# Patient Record
Sex: MALE | Race: WHITE
[De-identification: names, ages, dates, MRNs, and addresses within clinical notes are randomized per-mention and may not be internally consistent; named-entity substitution may affect disease eponyms.]

---

## 2020-03-28 ENCOUNTER — HOSPITAL ENCOUNTER (INPATIENT)
Dept: HOSPITAL 35 - 3W | Age: 70
LOS: 5 days | Discharge: HOME | DRG: 177 | End: 2020-04-02
Attending: HOSPITALIST | Admitting: HOSPITALIST
Payer: COMMERCIAL

## 2020-03-28 VITALS — BODY MASS INDEX: 30.06 KG/M2 | HEIGHT: 70 IN | WEIGHT: 210 LBS

## 2020-03-28 VITALS — SYSTOLIC BLOOD PRESSURE: 104 MMHG | DIASTOLIC BLOOD PRESSURE: 58 MMHG

## 2020-03-28 DIAGNOSIS — J12.89: ICD-10-CM

## 2020-03-28 DIAGNOSIS — R07.9: ICD-10-CM

## 2020-03-28 DIAGNOSIS — U07.1: Primary | ICD-10-CM

## 2020-03-28 DIAGNOSIS — R79.89: ICD-10-CM

## 2020-03-28 DIAGNOSIS — Z79.82: ICD-10-CM

## 2020-03-28 DIAGNOSIS — Z79.899: ICD-10-CM

## 2020-03-28 DIAGNOSIS — I25.10: ICD-10-CM

## 2020-03-28 DIAGNOSIS — I10: ICD-10-CM

## 2020-03-28 DIAGNOSIS — Z95.5: ICD-10-CM

## 2020-03-28 DIAGNOSIS — R06.02: ICD-10-CM

## 2020-03-28 DIAGNOSIS — Z79.2: ICD-10-CM

## 2020-03-28 LAB
ALBUMIN SERPL-MCNC: 3.3 G/DL (ref 3.4–5)
ALT SERPL-CCNC: 15 U/L (ref 30–65)
ANION GAP SERPL CALC-SCNC: 9 MMOL/L (ref 7–16)
AST SERPL-CCNC: 20 U/L (ref 15–37)
BILIRUB SERPL-MCNC: 0.3 MG/DL
BUN SERPL-MCNC: 23 MG/DL (ref 7–18)
CALCIUM SERPL-MCNC: 8.6 MG/DL (ref 8.5–10.1)
CHLORIDE SERPL-SCNC: 101 MMOL/L (ref 98–107)
CO2 SERPL-SCNC: 27 MMOL/L (ref 21–32)
CREAT SERPL-MCNC: 1 MG/DL (ref 0.7–1.3)
ERYTHROCYTE [DISTWIDTH] IN BLOOD BY AUTOMATED COUNT: 12.6 % (ref 10.5–14.5)
GLUCOSE SERPL-MCNC: 128 MG/DL (ref 74–106)
HCT VFR BLD CALC: 41.4 % (ref 42–52)
HGB BLD-MCNC: 14 GM/DL (ref 14–18)
MCH RBC QN AUTO: 30.3 PG (ref 26–34)
MCHC RBC AUTO-ENTMCNC: 33.8 G/DL (ref 28–37)
MCV RBC: 89.7 FL (ref 80–100)
PLATELET # BLD: 237 THOU/UL (ref 150–400)
POTASSIUM SERPL-SCNC: 3.9 MMOL/L (ref 3.5–5.1)
PROT SERPL-MCNC: 6.7 G/DL (ref 6.4–8.2)
RBC # BLD AUTO: 4.62 MIL/UL (ref 4.5–6)
SODIUM SERPL-SCNC: 137 MMOL/L (ref 136–145)
WBC # BLD AUTO: 7.2 THOU/UL (ref 4–11)

## 2020-03-28 PROCEDURE — 10879: CPT

## 2020-03-28 PROCEDURE — 10078: CPT

## 2020-03-28 PROCEDURE — 10779: CPT

## 2020-03-28 NOTE — NUR
DIRECT ADMIT FROM [facility] FOR TIGHTNESS OF CHEST. ADMITTED BY DR TALLEY FOR CHEST PAIN. PATIENT ARRIVED WITH HEPARIN DRIP. HEPARIN STOPPED.PER
REPORT FROM CLARK COSTA FOR [facility] ER PT WAS GIVEN HEPARIN BOLUS AND DRIP AT [facility] ER. 4 BABY ASA, AND 1 INCH NITRO PASTE. PATIENT TEST FOR COVID-19 AT
[facility] ON 3/26/20.
ALERT X4 FROM HOME WITH WIFE DAVID. PT DENIES CHEST PAIN, PT C/O OF SLIGHT
SHORTNESS OF BREATH. UP AB MASON IN ROOM.CONTINENT OF BLADDER LAST BM 3/26/20.
TOLERATING DIET. NSR ON TELE. FLUIDS ADMINISTERED AS ORDERED. CONSENTS SIGNED.
ADMISSION ASSESMENT AND HISTORY COMPLETED. HOME MEDS RECONSILED.
REPORT GIVEN TO NIGHT NURSE.

## 2020-03-29 VITALS — DIASTOLIC BLOOD PRESSURE: 60 MMHG | SYSTOLIC BLOOD PRESSURE: 121 MMHG

## 2020-03-29 VITALS — DIASTOLIC BLOOD PRESSURE: 79 MMHG | SYSTOLIC BLOOD PRESSURE: 115 MMHG

## 2020-03-29 VITALS — SYSTOLIC BLOOD PRESSURE: 107 MMHG | DIASTOLIC BLOOD PRESSURE: 72 MMHG

## 2020-03-29 VITALS — SYSTOLIC BLOOD PRESSURE: 115 MMHG | DIASTOLIC BLOOD PRESSURE: 74 MMHG

## 2020-03-29 VITALS — SYSTOLIC BLOOD PRESSURE: 96 MMHG | DIASTOLIC BLOOD PRESSURE: 68 MMHG

## 2020-03-29 VITALS — DIASTOLIC BLOOD PRESSURE: 77 MMHG | SYSTOLIC BLOOD PRESSURE: 118 MMHG

## 2020-03-29 VITALS — DIASTOLIC BLOOD PRESSURE: 71 MMHG | SYSTOLIC BLOOD PRESSURE: 121 MMHG

## 2020-03-29 VITALS — DIASTOLIC BLOOD PRESSURE: 71 MMHG | SYSTOLIC BLOOD PRESSURE: 118 MMHG

## 2020-03-29 LAB
ANION GAP SERPL CALC-SCNC: 8 MMOL/L (ref 7–16)
BUN SERPL-MCNC: 21 MG/DL (ref 7–18)
CALCIUM SERPL-MCNC: 8.1 MG/DL (ref 8.5–10.1)
CHLORIDE SERPL-SCNC: 104 MMOL/L (ref 98–107)
CO2 SERPL-SCNC: 26 MMOL/L (ref 21–32)
CREAT SERPL-MCNC: 0.8 MG/DL (ref 0.7–1.3)
ERYTHROCYTE [DISTWIDTH] IN BLOOD BY AUTOMATED COUNT: 12.6 % (ref 10.5–14.5)
GLUCOSE SERPL-MCNC: 112 MG/DL (ref 74–106)
HCT VFR BLD CALC: 36.3 % (ref 42–52)
HGB BLD-MCNC: 12.4 GM/DL (ref 14–18)
MCH RBC QN AUTO: 30.5 PG (ref 26–34)
MCHC RBC AUTO-ENTMCNC: 34.1 G/DL (ref 28–37)
MCV RBC: 89.4 FL (ref 80–100)
PLATELET # BLD: 224 THOU/UL (ref 150–400)
POTASSIUM SERPL-SCNC: 4 MMOL/L (ref 3.5–5.1)
RBC # BLD AUTO: 4.05 MIL/UL (ref 4.5–6)
SODIUM SERPL-SCNC: 138 MMOL/L (ref 136–145)
WBC # BLD AUTO: 6.5 THOU/UL (ref 4–11)

## 2020-03-29 NOTE — NUR
Pt. slept fair during the night. Denies any chest pain.Tolerating room air
well with O2 sat in the upper 90's.Reported shortness of breath with exertion.
Up ad ria in room with steady gait. Cardiology consult called to answering
service last night. Continue on isolation to R/O COVID 19. Afebrile. Will
continue to monitor.

## 2020-03-29 NOTE — NUR
PT ASSUMED AT 0700, PT AWAKE AND PLEASANT. ALERT AND OREINTED X4, NO SIGNS OF
DISTRESS NOTED. PT DENIES ANY CHEST PAIN, ASSESMMENT COMPLETED. PT DENIES ANY
NEEDS, CALL LIGHT IN REACH, BED AT LOWEST LEVEL WITH ALARM ON. WILL CONTINUE
TO MONITOR.

## 2020-03-30 VITALS — SYSTOLIC BLOOD PRESSURE: 131 MMHG | DIASTOLIC BLOOD PRESSURE: 73 MMHG

## 2020-03-30 VITALS — SYSTOLIC BLOOD PRESSURE: 154 MMHG | DIASTOLIC BLOOD PRESSURE: 87 MMHG

## 2020-03-30 VITALS — SYSTOLIC BLOOD PRESSURE: 127 MMHG | DIASTOLIC BLOOD PRESSURE: 80 MMHG

## 2020-03-30 VITALS — SYSTOLIC BLOOD PRESSURE: 139 MMHG | DIASTOLIC BLOOD PRESSURE: 99 MMHG

## 2020-03-30 VITALS — DIASTOLIC BLOOD PRESSURE: 71 MMHG | SYSTOLIC BLOOD PRESSURE: 144 MMHG

## 2020-03-30 LAB
CHOLEST SERPL-MCNC: 80 MG/DL (ref ?–200)
HDLC SERPL-MCNC: 24 MG/DL (ref 40–?)
LDLC SERPL-MCNC: 42 MG/DL (ref ?–100)
TC:HDL: 3.3 RATIO
TRIGL SERPL-MCNC: 73 MG/DL (ref ?–150)
VLDLC SERPL CALC-MCNC: 15 MG/DL (ref ?–40)

## 2020-03-30 NOTE — NUR
1030 [facility] laboratory called about pt COVID19
results, was told results not in yet and normally their results dont get in
until 8-9 days

## 2020-03-30 NOTE — NUR
INITIAL ASSESSMENT:
SW reviewed chart and spoke with attending physician. Pt was transferred to
[facility] from [facility] due to NStemi/elevated troponin. Cardiology is
consulted and following. Pt will need an echo/angio when COVID-19 screen is
negative. Pt may need a cardiac cath. SW attempted to contact pt in room to
obtain assessment info. Per chart, pt is alert/orientated x 4. Pt lives with
his wife in [place]. WINNIE is following to assist as needed with discharge
planning.

## 2020-03-30 NOTE — NUR
PT MAKING SLOW PROGRESS TOWARDS GOALS.  ON ROOM AIR THROUGHOUT THE NIGHT.
DENIES ANY SOA WHILE BEING UP TO THE TOILET.  HAS DENIED ANY CHEST PAIN
OVERNIGHT.  "BUT THERE IS SOMETHING THERE, I CAN FEEL IT THAT SOMETHING JUST
DOESN'T FEEL RIGHT."  ENCOURAGED TO CALL WITH ANY AXIETY OR ANY SENSE OF
IMPENDING DOOM.

## 2020-03-30 NOTE — NUR
PT CARE ASSUMED AT 0700, PT ALERT AND OREINTED X4, DENIES CHEST PAIN, NAUSEA
AND VOMITING. NO SIGNS OF DISTRESS NOTED. DYSPNEA WITH EXERTION. ASSESSMENT
COMPLETED. PT EATING BREAKFAST AT THE MOMENT. DENIES ANY NEEDS. CALL LIGHT AND
TABLE IN REACH. BED AT LOWEST LEVEL. WILL CONTINUE TO MONITOR.

## 2020-03-30 NOTE — NUR
SPOKE WITH  AT [facility].  NO RESULTS YET AS TO STATUS OF
COVID-19 SWAB.  DID PROVIDE TELEPHONE NUMBER AND FAX NUMBER TO CONTACT US
SHOULD RESULTS BE CALLED TO THEIR LAB INSTEAD OF [facility].

## 2020-03-31 VITALS — DIASTOLIC BLOOD PRESSURE: 49 MMHG | SYSTOLIC BLOOD PRESSURE: 95 MMHG

## 2020-03-31 VITALS — DIASTOLIC BLOOD PRESSURE: 79 MMHG | SYSTOLIC BLOOD PRESSURE: 128 MMHG

## 2020-03-31 VITALS — SYSTOLIC BLOOD PRESSURE: 136 MMHG | DIASTOLIC BLOOD PRESSURE: 73 MMHG

## 2020-03-31 VITALS — DIASTOLIC BLOOD PRESSURE: 47 MMHG | SYSTOLIC BLOOD PRESSURE: 102 MMHG

## 2020-03-31 VITALS — DIASTOLIC BLOOD PRESSURE: 83 MMHG | SYSTOLIC BLOOD PRESSURE: 132 MMHG

## 2020-03-31 VITALS — SYSTOLIC BLOOD PRESSURE: 118 MMHG | DIASTOLIC BLOOD PRESSURE: 64 MMHG

## 2020-03-31 NOTE — NUR
PT CARE ASSUMED AT 0700, PT ALERT AND ORIENTED X4, DENIES NAUSEA, VOMITING AND
CHEST PAIN. PT COMPLAINS OF CHEST TIGHTNESS/PRESSURE WHEN UP TO THE BATHROOM.
DR. TALLEY MADE AWARE. PT DENIES ANY OTHER NEEDS AT THIS TIME. CALL LIGHT AND
TABLE WITH REACH. BED TA LOWEST LEVEL. WILL CONTINUE TO MONITOR.

## 2020-03-31 NOTE — NUR
PT MAKING PROGRESS TOWARDS GOALS.  PTINITIALLY REPORTED 1/10 LEFT CHEST
TIGHTNESS THAT OCCURED WITH ACITIVITY (UP TO USE THE BATHROOM).  HE DID STATE
THAT THE DISCOMFORT RECEEDS PROMPTLY WITH REST. "I JUST THINK THAT SOMETHING
IS WRONG."  HAS DENIED ANY SOA.

## 2020-03-31 NOTE — NUR
SW reviewed chart and spoke with nursing and attending physician. Pt had
COVID-19 test ordered yesterday. Pt was previously tested at [facility] on
3/26. SW notified by nursing that pt's results from [facility] are positive
for COVID-19. Pt transferred to ICU. WINNIE is following to assist as needed with
discharge planning.

## 2020-04-01 VITALS — DIASTOLIC BLOOD PRESSURE: 55 MMHG | SYSTOLIC BLOOD PRESSURE: 116 MMHG

## 2020-04-01 VITALS — SYSTOLIC BLOOD PRESSURE: 131 MMHG | DIASTOLIC BLOOD PRESSURE: 69 MMHG

## 2020-04-01 VITALS — DIASTOLIC BLOOD PRESSURE: 59 MMHG | SYSTOLIC BLOOD PRESSURE: 116 MMHG

## 2020-04-01 VITALS — DIASTOLIC BLOOD PRESSURE: 62 MMHG | SYSTOLIC BLOOD PRESSURE: 113 MMHG

## 2020-04-01 VITALS — SYSTOLIC BLOOD PRESSURE: 108 MMHG | DIASTOLIC BLOOD PRESSURE: 63 MMHG

## 2020-04-01 LAB
ANION GAP SERPL CALC-SCNC: 6 MMOL/L (ref 7–16)
BUN SERPL-MCNC: 10 MG/DL (ref 7–18)
CALCIUM SERPL-MCNC: 7.8 MG/DL (ref 8.5–10.1)
CHLORIDE SERPL-SCNC: 104 MMOL/L (ref 98–107)
CO2 SERPL-SCNC: 27 MMOL/L (ref 21–32)
CREAT SERPL-MCNC: 0.8 MG/DL (ref 0.7–1.3)
ERYTHROCYTE [DISTWIDTH] IN BLOOD BY AUTOMATED COUNT: 12.2 % (ref 10.5–14.5)
GLUCOSE SERPL-MCNC: 104 MG/DL (ref 74–106)
HCT VFR BLD CALC: 29.7 % (ref 42–52)
HGB BLD-MCNC: 10.2 GM/DL (ref 14–18)
MCH RBC QN AUTO: 30.8 PG (ref 26–34)
MCHC RBC AUTO-ENTMCNC: 34.4 G/DL (ref 28–37)
MCV RBC: 89.7 FL (ref 80–100)
PLATELET # BLD: 286 THOU/UL (ref 150–400)
POTASSIUM SERPL-SCNC: 3.7 MMOL/L (ref 3.5–5.1)
RBC # BLD AUTO: 3.31 MIL/UL (ref 4.5–6)
SODIUM SERPL-SCNC: 137 MMOL/L (ref 136–145)
WBC # BLD AUTO: 7.1 THOU/UL (ref 4–11)

## 2020-04-01 NOTE — NUR
1900. PT ALERT AND ORIENTED.  VSS WITH OCCASIONALLY SOFT PRESSURES. NO FEVER ,
NO NAUSEA OR VOMITING REPORTED. PT DENIES ANY CHEST PAIN OR SOB. VOIDING TO
THE BATHROOM; STEADY GAIT.  SR TO SB ON THE MONITOR WHEN ASLEEP.  PT ALSO
DESATS WHEN SLEEPING.  AS PT HX OF SLEEP APNEA AND STATED, " MY WIFE TELLS ME
I STOP BREATHING SOMETIMES WHEN SLEEPING." PT OTHERWISE DENIES ANY HX OF . O2
3L NC INITIATED. SATS MAINTAINED > 98.  NO FURTHER ISSUE AT THIS TIME.
ISOLATION FOR COVID+ MAINTAINED. WIFE UPDATE ON PATIENTS CONDITION LAST NIGHT.
WILL CONTINUE TO FOLLOW POC.

## 2020-04-01 NOTE — NUR
ASSESSMENTS AND INTERVENTIONS AS DOCCUMENTED. NO MAJOR CHANGES THROUGH OUT
SHIFT. PATIENT UP ADLIB NEEDING MINIMAL ASSISTANCE. PATIENT PROGRESSING
TOWARDS GOALS AS EVIDENCE BY INCREASED MOBILITY AND APETITIE. CARDIOLOGY NOT
INTERVENING AT THIS TIME.

## 2020-04-02 VITALS — DIASTOLIC BLOOD PRESSURE: 84 MMHG | SYSTOLIC BLOOD PRESSURE: 162 MMHG

## 2020-04-02 VITALS — SYSTOLIC BLOOD PRESSURE: 132 MMHG | DIASTOLIC BLOOD PRESSURE: 63 MMHG

## 2020-04-02 VITALS — SYSTOLIC BLOOD PRESSURE: 138 MMHG | DIASTOLIC BLOOD PRESSURE: 72 MMHG

## 2020-04-02 VITALS — SYSTOLIC BLOOD PRESSURE: 148 MMHG | DIASTOLIC BLOOD PRESSURE: 70 MMHG

## 2020-04-02 LAB
ANION GAP SERPL CALC-SCNC: 7 MMOL/L (ref 7–16)
BUN SERPL-MCNC: 9 MG/DL (ref 7–18)
CALCIUM SERPL-MCNC: 8 MG/DL (ref 8.5–10.1)
CHLORIDE SERPL-SCNC: 104 MMOL/L (ref 98–107)
CO2 SERPL-SCNC: 27 MMOL/L (ref 21–32)
CREAT SERPL-MCNC: 0.8 MG/DL (ref 0.7–1.3)
ERYTHROCYTE [DISTWIDTH] IN BLOOD BY AUTOMATED COUNT: 12.2 % (ref 10.5–14.5)
GLUCOSE SERPL-MCNC: 102 MG/DL (ref 74–106)
HCT VFR BLD CALC: 33 % (ref 42–52)
HGB BLD-MCNC: 11.3 GM/DL (ref 14–18)
MCH RBC QN AUTO: 30.3 PG (ref 26–34)
MCHC RBC AUTO-ENTMCNC: 34.2 G/DL (ref 28–37)
MCV RBC: 88.7 FL (ref 80–100)
PLATELET # BLD: 353 THOU/UL (ref 150–400)
POTASSIUM SERPL-SCNC: 3.8 MMOL/L (ref 3.5–5.1)
RBC # BLD AUTO: 3.72 MIL/UL (ref 4.5–6)
SODIUM SERPL-SCNC: 138 MMOL/L (ref 136–145)
WBC # BLD AUTO: 6.9 THOU/UL (ref 4–11)

## 2020-04-02 NOTE — NUR
ASSUMED PT CARE AT 1900. PT IS ALERT AND ORIENTED. PT IS LAYING IN BED. NO
SIGN OF DISTRESS NOTED IN PT. DENIES ANY PAIN. PT IS STABLE. VITAL SIGNS
STABLE. ASSESSMENT COMPLETED AND DOCUMENTED. ISOLATION IN PLACE. SCHEDULED
MEDS ADMINISTERED TO PT. PT IS AFEBRILE. CONTINUE TO MONITOR PT. DENIES ANY
FURTHER NEEDS AT THIS TIME.

## 2020-04-02 NOTE — NUR
PT DC TO HOME WILL COURATIN AT HOME DISCHARGE INSTRUCTIONS GIVEN PER NURSING
VERBALZIE UNDERSTANDING. WIFE ON HER WAY TO GET PT FOR . ALL BELONGING
WITH PT PRIOR TO DISCHARGE. NO CONCERNS OR ISSUES NOTED AT THIS TIME

## 2020-04-02 NOTE — NUR
discussed during los, pt will dc home today and need to self quarantine. he
asymptomatic of covid 19, which he tested positive for. follow up with
cardiology in about 3 weeks. cm left message from bedside nurse. will cont
following as needed for dc needs.

## 2020-04-24 ENCOUNTER — HOSPITAL ENCOUNTER (OUTPATIENT)
Dept: HOSPITAL 35 - SJCVCIMAG | Age: 70
End: 2020-04-24
Attending: INTERNAL MEDICINE
Payer: COMMERCIAL

## 2020-04-24 DIAGNOSIS — Z87.891: ICD-10-CM

## 2020-04-24 DIAGNOSIS — E78.00: ICD-10-CM

## 2020-04-24 DIAGNOSIS — R00.1: ICD-10-CM

## 2020-04-24 DIAGNOSIS — Z79.899: ICD-10-CM

## 2020-04-24 DIAGNOSIS — I35.8: Primary | ICD-10-CM

## 2020-04-24 DIAGNOSIS — Z79.82: ICD-10-CM

## 2020-04-24 DIAGNOSIS — E78.5: ICD-10-CM

## 2020-04-24 DIAGNOSIS — I10: ICD-10-CM

## 2020-04-24 DIAGNOSIS — R53.83: ICD-10-CM

## 2020-04-24 DIAGNOSIS — I25.10: ICD-10-CM

## 2020-04-24 DIAGNOSIS — Z95.5: ICD-10-CM

## 2020-05-04 ENCOUNTER — HOSPITAL ENCOUNTER (INPATIENT)
Dept: HOSPITAL 35 - CATH | Age: 70
Discharge: HOME | DRG: 286 | End: 2020-05-04
Attending: INTERNAL MEDICINE | Admitting: INTERNAL MEDICINE
Payer: COMMERCIAL

## 2020-05-04 VITALS — SYSTOLIC BLOOD PRESSURE: 153 MMHG | DIASTOLIC BLOOD PRESSURE: 85 MMHG

## 2020-05-04 VITALS — DIASTOLIC BLOOD PRESSURE: 75 MMHG | SYSTOLIC BLOOD PRESSURE: 156 MMHG

## 2020-05-04 VITALS — WEIGHT: 205.01 LBS | BODY MASS INDEX: 31.07 KG/M2 | HEIGHT: 67.99 IN

## 2020-05-04 VITALS — DIASTOLIC BLOOD PRESSURE: 81 MMHG | SYSTOLIC BLOOD PRESSURE: 158 MMHG

## 2020-05-04 VITALS — SYSTOLIC BLOOD PRESSURE: 139 MMHG | DIASTOLIC BLOOD PRESSURE: 119 MMHG

## 2020-05-04 DIAGNOSIS — E78.5: ICD-10-CM

## 2020-05-04 DIAGNOSIS — I25.10: Primary | ICD-10-CM

## 2020-05-04 DIAGNOSIS — U07.1: ICD-10-CM

## 2020-05-04 DIAGNOSIS — I10: ICD-10-CM

## 2020-05-04 DIAGNOSIS — J44.9: ICD-10-CM

## 2020-05-04 DIAGNOSIS — M19.90: ICD-10-CM

## 2020-05-04 DIAGNOSIS — E78.00: ICD-10-CM

## 2020-05-04 DIAGNOSIS — Z82.49: ICD-10-CM

## 2020-05-04 DIAGNOSIS — Z98.52: ICD-10-CM

## 2020-05-04 LAB
ALBUMIN SERPL-MCNC: 3.8 G/DL (ref 3.4–5)
ALT SERPL-CCNC: 21 U/L (ref 30–65)
ANION GAP SERPL CALC-SCNC: 6 MMOL/L (ref 7–16)
APTT BLD: 27.8 SECONDS (ref 24.5–32.8)
AST SERPL-CCNC: 14 U/L (ref 15–37)
BASOPHILS NFR BLD AUTO: 0.9 % (ref 0–2)
BILIRUB SERPL-MCNC: 0.7 MG/DL
BILIRUB UR-MCNC: NEGATIVE MG/DL
BUN SERPL-MCNC: 15 MG/DL (ref 7–18)
CALCIUM SERPL-MCNC: 8.5 MG/DL (ref 8.5–10.1)
CHLORIDE SERPL-SCNC: 100 MMOL/L (ref 98–107)
CO2 SERPL-SCNC: 30 MMOL/L (ref 21–32)
COLOR UR: YELLOW
CREAT SERPL-MCNC: 0.9 MG/DL (ref 0.7–1.3)
EOSINOPHIL NFR BLD: 5.1 % (ref 0–3)
ERYTHROCYTE [DISTWIDTH] IN BLOOD BY AUTOMATED COUNT: 13.5 % (ref 10.5–14.5)
GLUCOSE SERPL-MCNC: 125 MG/DL (ref 74–106)
GRANULOCYTES NFR BLD MANUAL: 44 % (ref 36–66)
HCT VFR BLD CALC: 39.4 % (ref 42–52)
HGB BLD-MCNC: 13.4 GM/DL (ref 14–18)
INR PPP: 1.1
KETONES UR STRIP-MCNC: NEGATIVE MG/DL
LYMPHOCYTES NFR BLD AUTO: 41.2 % (ref 24–44)
MCH RBC QN AUTO: 31.1 PG (ref 26–34)
MCHC RBC AUTO-ENTMCNC: 34.1 G/DL (ref 28–37)
MCV RBC: 91.1 FL (ref 80–100)
MONOCYTES NFR BLD: 8.8 % (ref 1–8)
NEUTROPHILS # BLD: 3.3 THOU/UL (ref 1.4–8.2)
PLATELET # BLD: 255 THOU/UL (ref 150–400)
POTASSIUM SERPL-SCNC: 3.3 MMOL/L (ref 3.5–5.1)
PROT SERPL-MCNC: 7.5 G/DL (ref 6.4–8.2)
PROTHROMBIN TIME: 10.9 SECONDS (ref 9.3–11.4)
RBC # BLD AUTO: 4.32 MIL/UL (ref 4.5–6)
RBC # UR STRIP: NEGATIVE /UL
SODIUM SERPL-SCNC: 136 MMOL/L (ref 136–145)
SP GR UR STRIP: 1.01 (ref 1–1.03)
URINE CLARITY: CLEAR
URINE GLUCOSE-RANDOM*: NEGATIVE
URINE LEUKOCYTES-REFLEX: NEGATIVE
URINE NITRITE-REFLEX: NEGATIVE
URINE PROTEIN (DIPSTICK): NEGATIVE
UROBILINOGEN UR STRIP-ACNC: 0.2 E.U./DL (ref 0.2–1)
WBC # BLD AUTO: 7.5 THOU/UL (ref 4–11)

## 2020-05-04 PROCEDURE — 10081 I&D PILONIDAL CYST COMP: CPT

## 2020-05-04 NOTE — 2DMMODE
[facility]
[address]
[place]   [address]                   2 D/M-MODE ECHOCARDIOGRAM     
_______________________________________________________________________________
 
Name:       ANNI RANGEL SHON                Room #:         206-P       ADM IN  
M.R.#:      [id number]                       Account #:      [id number]  
Admission:  05/04/20    Attend Phys:    Brad Paiz MD,
Discharge:              Date of Birth:  [birth year]  
                                                          Report #: [id number]
                                                                    [id number]
_______________________________________________________________________________
THIS REPORT FOR:  
 
cc:  Michael Kimbrough MD, Curtis MD Mancuso, Gerald M. MD formerly [facility]                                        
                                                                       ~
 
--------------- APPROVED REPORT --------------
 
 
Study performed:  05/04/2020 15:58:32
 
EXAM: Limited 2D, Doppler, and color-flow Echocardiogram 
Patient Location: Bedside   
Room #:  206     Status:  routine
 
      BSA:         2.07
HR: 60 bpm BP:          139/119 mmHg 
 
Other Information 
Study Quality: Adequate
 
Indications
CAD
Pre-op
 
2D Dimensions
IVSd:  13.72 (7-11mm) 
LVDd:  44.26 mm  
PWd:  13.64 (7-11mm) 
LVDs:  29.60 (25-40mm) 
   IVC:  18.00 mm
 
Aortic Valve
AoV Peak Markel.:  1.18 m/s 
AO Peak Gr.:  7.03 mmHg  LVOT Max PG:  3.32 mmHg
    LVOT Max V:  0.91 m/s
AI Vmax:  3.83 m/s  
AI Oneida:  1.38 m/s2  
AI PHT:  804.82 ms  
 
Tricuspid Valve
 RAP Estimate:  5.00 mmHg
 
Left Ventricle
The left ventricle is normal size. Mild to moderate concentric left 
 
 
[facility]
[address]
[place]  [address]
Phone:  [phone]                    2 D/M-MODE ECHOCARDIOGRAM     
_______________________________________________________________________________
 
Name:            ANNI RANGEL                Room #:        [facility]-       ADM IN
M.R.#:           [id number]          Account #:     [id number]  
Admission:       05/04/20         Attend Phys:   Brad Paiz,
Discharge:                  Date of Birth: [birth year]  
                         Report #:      [id number]
        [id number]
_______________________________________________________________________________
ventricular hypertrophy. The left ventricular systolic function is 
normal. The left ventricular ejection fraction is within the normal 
range. LVEF is 60-65%.
 
Right Ventricle
The right ventricle is normal size. The right ventricular systolic 
function is normal.
 
Atria
The left atrium size is normal. The right atrium size is 
normal.
 
Aortic Valve
The aortic valve is normal in structure. Mild aortic regurgitation. 
There is no aortic valvular stenosis.
 
Mitral Valve
The mitral valve is normal in structure. There is no mitral valve 
regurgitation noted. No evidence of mitral valve stenosis.
 
Tricuspid Valve
The tricuspid valve is normal in structure. There is no tricuspid 
valve regurgitation noted. Unable to assess PA pressure.
 
Great Vessels
The aortic root is normal in size. IVC is normal in size and 
collapses >50% with inspiration.
 
Pericardium
There is no pericardial effusion.
 
<Conclusion>
The left ventricle is normal size.
Mild to moderate concentric left ventricular hypertrophy.
LVEF is 60-65%.
The right ventricle is normal size.
The left atrium size is normal.
Mild aortic regurgitation.
There is no mitral valve regurgitation noted.
There is no tricuspid valve regurgitation noted.
Unable to assess PA pressure.
 
 
[facility]
[address]
[place]  [address]
Phone:  [phone]                    2 D/M-MODE ECHOCARDIOGRAM     
_______________________________________________________________________________
 
Name:            CLAIREANNI MENENDEZ                Room #:        206-P       [facility] [place]
M.R.#:           [id number]          Account #:     [id number]  
Admission:       05/04/20         Attend Phys:   Brad Paiz,
Discharge:                  Date of Birth: [birth year]  
                         Report #:      [id number]
        [id number]
_______________________________________________________________________________
The aortic root is normal in size.
There is no pericardial effusion.
 
 
 
 
 
 
 
 
 
 
 
 
 
 
 
 
 
 
 
 
 
 
 
 
 
 
 
 
 
 
 
 
 
 
 
 
 
 
 
 
 
 
  <ELECTRONICALLY SIGNED>
   By: Brad Paiz MD, FACC   
  05/04/20 1748
D: 05/04/20 1748                           _____________________________________
T: 05/04/20 1748                           Brad Paiz MD, FACC     /INF

## 2020-05-04 NOTE — NUR
PT ADMITED FROM CATH LAB. ADMISSION HX AND ASSESSMENT COMPLETED. VSS. RIGHT
GROIN INCISION C/D/I. NO HEMATOMA NOTED. DR. MOE CONSULTED FOR CABG ON
THURSDAY. NEW ORDERS NOTED. WILL CONTINUE TO MONITOR.

## 2020-05-04 NOTE — HC
[facility]
[address]
[place]   [address]                     CONSULTATION                  
_______________________________________________________________________________
 
Name:       ANNI RANGEL                Room #:         206-P       ADM IN  
M.R.#:      [id number]                       Account #:      [id number]  
Admission:  05/04/20    Attend Phys:    Brad Paiz MD,
Discharge:              Date of Birth:  [birth year]  
                                                          Report #: [id number]
                                                                    [id number]   
_______________________________________________________________________________
THIS REPORT FOR:  
 
cc:  Michael Kimbrough MD,Michael Villar,Kirk GIFFORD MD                                            ~
CC: Michael Paiz
 
DATE OF SERVICE:  05/04/2020
 
 
We were asked to see the patient by Dr. Paiz.
 
HISTORY OF PRESENT ILLNESS:  The patient is a 69-year-old who had coronavirus. 
The patient was hospitalized with progressive chest pain and shortness of
breath.  He also had an elevated troponin.  The patient had fever and fatigue. 
He was treated for COVID-19 with Plaquenil and azithromycin and since treatment
has had 2 negative tests, the last testing approximately 2 weeks ago according
to the patient.
 
Today, the patient has a cardiac catheterization that shows important LAD and
circumflex stenosis.  The patient has a previous stent in the circumflex that
has 80% lesion and that there is also 90% LAD stenosis, right coronary has
trivial disease.  Left ventricular function is satisfactory.
 
PAST MEDICAL HISTORY:  Significant for hypertension.  The patient was also
treated for hyperlipidemia.  He is a former smoker.
 
MEDICATIONS:  Include aspirin, atorvastatin, iron, hydroxychloroquine,
quinapril, Bystolic.
 
ALLERGIES:  None known.
 
SOCIAL HISTORY:  As mentioned, former smoker.  The patient is retired, lives
with his wife in the [facility].
 
REVIEW OF SYSTEMS:
GENERAL:  As mentioned, has some fatigue and shortness of breath, previous fever
with the COVID.  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
RESPIRATORY:  Short of breath with the COVID, better now.
CARDIAC:  Chest heaviness with COVID, better now.  No palpitations.
GASTROINTESTINAL:  No nausea, vomiting, or diarrhea.
GENITOURINARY:  No urgency, frequency, or blood.
MUSCULOSKELETAL:  No bone or joint pain.
SKIN:  No rash or infection.
NEUROLOGIC:  No motor or sensory dysfunction.
 
 
 
[facility]
[address]
[place]   [address]                     CONSULTATION                  
_______________________________________________________________________________
 
Name:       ANNI RANGEL SHON                Room #:         206-[facility] [place]  
M.R.#:      [id number]                       Account #:      [id number]  
Admission:  05/04/20    Attend Phys:    Brad Paiz MD,
Discharge:              Date of Birth:  [birth year]  
                                                          Report #: [id number]
                                                                    [id number]   
_______________________________________________________________________________
ENDOCRINE:  No hot or cold intolerance.  No goiter, no tremor.
HEMATOLOGIC:  No bruisability or bleeding.
 
PHYSICAL EXAMINATION:
VITAL SIGNS:  Blood pressure 151/73, heart rate 62, O2 sat 93 on room air.
HEENT:  No scleral icterus, no arcus.
NECK:  No mass, no bruit.
CHEST:  Clear to auscultation.
HEART:  Rhythm regular.
ABDOMEN:  Soft.
EXTREMITIES:  No clubbing, cyanosis or edema.
VASCULAR:  No obvious saphenous vein problems, 2+ dorsalis pedis pulses
bilaterally.
NEUROLOGIC:  No motor or sensory dysfunction.
MUSCULOSKELETAL:  No bone or joint asymmetry or deformity.
PSYCHIATRIC:  Shows insight into problem.
 
ASSESSMENT:  The patient has severe left anterior descending and circumflex
coronary artery disease with preserved ventricular function.  Risks and details
of the surgery for this were discussed.  Risks include but are not limited to
bleeding, infection, anesthesia risks, heart and lung problems, stroke and
death.  Options and alternatives were reviewed.  The patient understands all of
this and wishes to proceed.  We will arrange for surgery later this week.
 
Thank you for the consult.
 
 
 
 
 
 
 
 
 
 
 
 
 
 
 
 
 
 
 
  <ELECTRONICALLY SIGNED>
   By: Kirk Villar MD            
  05/04/20     1328
D: 05/04/20 1149                           _____________________________________
T: 05/04/20 1250                           Kirk Villar MD              /nt

## 2020-05-04 NOTE — H
[facility]
[address]
[place]   [address]                     HISTORY AND PHYSICAL          
_______________________________________________________________________________
 
Name:       ANNI RANGEL                Room #:                     REG GILLES GARCIA.#:      [id number]                       Account #:      [id number]  
Admission:  05/04/20    Attend Phys:    Brad Paiz MD,
Discharge:              Date of Birth:  [birth year]  
                                                          Report #: [id number]
                                                                    [id number]   
_______________________________________________________________________________
THIS REPORT FOR:  
 
cc:  Michael Kimbrough MD,Brad Cosme MD, MD [facility]                                    ~
CC: Michael Young MD
 
DATE OF SERVICE:  05/04/2020
 
 
HISTORY OF PRESENT ILLNESS:  The patient is a 69-year-old male well known to
myself.  He is admitted today for right and left heart catheterization.  He had
been diagnosed over a month ago with the COVID virus, was hospitalized here and
then since tested negative x 2.  He was not "very sick" with this and he has
short time in the hospital, has done well since, but having some stable anginal
symptoms.  His wife tells a slightly different story that he is having more
angina than he would admit to and has not had the energy that he once had, this
is unrelated, appears to his prior infection.  No fever or chills.  He did have
a slight bump in the troponin at his hospitalization last month, but we did not
evaluate that further, wanted him to recover from the virus.  He has been
maintained on aspirin, atorvastatin, iron, Plaquenil, quinapril and Bystolic 20.
 
PAST MEDICAL HISTORY:  Positive for coronary artery disease, emergent stents
placed to the circumflex in 2006, hypertension, hypercholesterolemia, DJD,
vasectomy, a prior smoker.
 
SOCIAL HISTORY:  He is retired.  He is .  Prior tobacco.  Prior alcohol,
no drug use.
 
FAMILY HISTORY:  Positive for premature coronary artery disease.
 
ALLERGIES:  No known drug allergies.
 
PHYSICAL EXAMINATION:
VITAL SIGNS:  Pulse was 70s, blood pressure 160/80s.
HEENT:  Eyes reveal xanthelasmas.  Pharynx is clear.
NECK:  Shows preserved upstrokes without JVD or bruits.
LUNGS:  Slightly prolonged expiratory phase, but clear.
CARDIOVASCULAR:  Regular rate and rhythm, S1, S2.
ABDOMEN:  Soft.  No HSM or abdominal bruit.
EXTREMITIES:  Reveal diminished pulses, but intact.
NEUROLOGIC:  Nonfocal.
SKIN:  Warm and dry.  There are some eczematous changes on his face with slight
malar rash noted.  No ulcers.
 
 
 
[facility]
[address]
[place]   [address]                     HISTORY AND PHYSICAL          
_______________________________________________________________________________
 
Name:       ANNI RANGEL SHON                Room #:                     ALICIA CAMPOVERDE 
NATHANIEL#:      [id number]                       Account #:      [id number]  
Admission:  05/04/20    Attend Phys:    Brad Paiz MD,
Discharge:              Date of Birth:  [birth year]  
                                                          Report #: [id number]
                                                                    [id number]   
_______________________________________________________________________________
 
ASSESSMENT:
1.  Coronary artery disease, cardiac catheterization today reveals significant
3-vessel coronary artery disease.  He is with significant proximal
calcification.  He is going to be best served with bypass surgery for
revascularization.
2.  Hypertension.
3.  Hypercholesterolemia.
4.  Degenerative joint disease.
5.  Status post COVID infection with negative tests x 2.
6.  Chronic obstructive pulmonary disease with prior tobacco use.
 
RECOMMENDATIONS AND PLAN:  The patient will be transferred to CCU.  We will
obtain a stent.  We will obtain carotid Doppler and proceed on with
revascularization by bypass in the next day or two.  CV surgical consultation. 
I have discussed with Dr. Villar and the patient's wife.
 
Thank you for asking me to assist in the care of this patient.
 
 
 
 
 
 
 
 
 
 
 
 
 
 
 
 
 
 
 
 
 
 
 
 
 
 
                         
   By:                               
                   
D: 05/04/20 1150                           _____________________________________
T: 05/04/20 1257                           Brad Paiz MD, FACC     /nt

## 2020-05-05 LAB
EST. AVERAGE GLUCOSE BLD GHB EST-MCNC: 117 MG/DL
GLYCOHEMOGLOBIN (HGB A1C): 5.7 % (ref 4.8–5.6)

## 2020-05-07 ENCOUNTER — HOSPITAL ENCOUNTER (INPATIENT)
Dept: HOSPITAL 35 - PRE | Age: 70
LOS: 14 days | Discharge: HOME | DRG: 235 | End: 2020-05-21
Attending: THORACIC SURGERY (CARDIOTHORACIC VASCULAR SURGERY) | Admitting: THORACIC SURGERY (CARDIOTHORACIC VASCULAR SURGERY)
Payer: COMMERCIAL

## 2020-05-07 VITALS — WEIGHT: 204 LBS | BODY MASS INDEX: 30.92 KG/M2 | HEIGHT: 67.99 IN

## 2020-05-07 DIAGNOSIS — E78.5: ICD-10-CM

## 2020-05-07 DIAGNOSIS — I10: ICD-10-CM

## 2020-05-07 DIAGNOSIS — E78.00: ICD-10-CM

## 2020-05-07 DIAGNOSIS — I25.10: Primary | ICD-10-CM

## 2020-05-07 DIAGNOSIS — Z79.899: ICD-10-CM

## 2020-05-07 DIAGNOSIS — E87.70: ICD-10-CM

## 2020-05-07 DIAGNOSIS — J91.8: ICD-10-CM

## 2020-05-07 DIAGNOSIS — D62: ICD-10-CM

## 2020-05-07 DIAGNOSIS — J96.21: ICD-10-CM

## 2020-05-07 DIAGNOSIS — Z79.82: ICD-10-CM

## 2020-05-07 DIAGNOSIS — I48.0: ICD-10-CM

## 2020-05-07 DIAGNOSIS — J44.9: ICD-10-CM

## 2020-05-07 DIAGNOSIS — M19.90: ICD-10-CM

## 2020-05-07 PROCEDURE — 54118: CPT

## 2020-05-07 PROCEDURE — 47002: CPT

## 2020-05-07 PROCEDURE — 52259: CPT

## 2020-05-07 PROCEDURE — 65120: CPT

## 2020-05-07 PROCEDURE — 56524: CPT

## 2020-05-07 PROCEDURE — 56668: CPT

## 2020-05-07 PROCEDURE — 62110: CPT

## 2020-05-07 PROCEDURE — 65047: CPT

## 2020-05-07 PROCEDURE — 47297: CPT

## 2020-05-07 PROCEDURE — 56527: CPT

## 2020-05-07 PROCEDURE — 57116: CPT

## 2020-05-07 PROCEDURE — 56534: CPT

## 2020-05-07 PROCEDURE — 56525: CPT

## 2020-05-07 PROCEDURE — 53358: CPT

## 2020-05-07 PROCEDURE — 50010 RENAL EXPLORATION: CPT

## 2020-05-07 PROCEDURE — 52131: CPT

## 2020-05-07 PROCEDURE — 56455: CPT

## 2020-05-07 PROCEDURE — 50668: CPT

## 2020-05-07 PROCEDURE — 50456: CPT

## 2020-05-07 PROCEDURE — 53327: CPT

## 2020-05-07 PROCEDURE — 47000 NEEDLE BIOPSY OF LIVER PERQ: CPT

## 2020-05-07 PROCEDURE — 56898: CPT

## 2020-05-07 PROCEDURE — 65003: CPT

## 2020-05-07 PROCEDURE — 50409: CPT

## 2020-05-07 PROCEDURE — 52314: CPT

## 2020-05-07 PROCEDURE — 57167: CPT

## 2020-05-07 PROCEDURE — 57093: CPT

## 2020-05-07 PROCEDURE — 50249: CPT

## 2020-05-07 PROCEDURE — 50498: CPT

## 2020-05-07 PROCEDURE — 56531: CPT

## 2020-05-07 PROCEDURE — 56528: CPT

## 2020-05-07 PROCEDURE — 10081 I&D PILONIDAL CYST COMP: CPT

## 2020-05-07 PROCEDURE — 10078: CPT

## 2020-05-07 PROCEDURE — 56526: CPT

## 2020-05-07 PROCEDURE — 62950: CPT

## 2020-05-07 PROCEDURE — 51301: CPT

## 2020-05-07 PROCEDURE — 56760: CPT

## 2020-05-07 PROCEDURE — 48888: CPT

## 2020-05-07 PROCEDURE — 47001 NDL BIOPSY LVR TM OTH MAJ PX: CPT

## 2020-05-07 PROCEDURE — 65090: CPT

## 2020-05-07 PROCEDURE — 65135 INSERT OCULAR IMPLANT: CPT

## 2020-05-15 VITALS — SYSTOLIC BLOOD PRESSURE: 113 MMHG | DIASTOLIC BLOOD PRESSURE: 59 MMHG

## 2020-05-15 VITALS — SYSTOLIC BLOOD PRESSURE: 123 MMHG | DIASTOLIC BLOOD PRESSURE: 59 MMHG

## 2020-05-15 VITALS — SYSTOLIC BLOOD PRESSURE: 123 MMHG | DIASTOLIC BLOOD PRESSURE: 69 MMHG

## 2020-05-15 VITALS — DIASTOLIC BLOOD PRESSURE: 70 MMHG | SYSTOLIC BLOOD PRESSURE: 109 MMHG

## 2020-05-15 VITALS — DIASTOLIC BLOOD PRESSURE: 65 MMHG | SYSTOLIC BLOOD PRESSURE: 109 MMHG

## 2020-05-15 VITALS — SYSTOLIC BLOOD PRESSURE: 113 MMHG | DIASTOLIC BLOOD PRESSURE: 63 MMHG

## 2020-05-15 VITALS — SYSTOLIC BLOOD PRESSURE: 122 MMHG | DIASTOLIC BLOOD PRESSURE: 57 MMHG

## 2020-05-15 VITALS — SYSTOLIC BLOOD PRESSURE: 101 MMHG | DIASTOLIC BLOOD PRESSURE: 54 MMHG

## 2020-05-15 VITALS — SYSTOLIC BLOOD PRESSURE: 101 MMHG | DIASTOLIC BLOOD PRESSURE: 57 MMHG

## 2020-05-15 VITALS — DIASTOLIC BLOOD PRESSURE: 64 MMHG | SYSTOLIC BLOOD PRESSURE: 112 MMHG

## 2020-05-15 VITALS — DIASTOLIC BLOOD PRESSURE: 50 MMHG | SYSTOLIC BLOOD PRESSURE: 114 MMHG

## 2020-05-15 VITALS — SYSTOLIC BLOOD PRESSURE: 114 MMHG | DIASTOLIC BLOOD PRESSURE: 61 MMHG

## 2020-05-15 VITALS — SYSTOLIC BLOOD PRESSURE: 106 MMHG | DIASTOLIC BLOOD PRESSURE: 59 MMHG

## 2020-05-15 VITALS — DIASTOLIC BLOOD PRESSURE: 57 MMHG | SYSTOLIC BLOOD PRESSURE: 101 MMHG

## 2020-05-15 VITALS — DIASTOLIC BLOOD PRESSURE: 66 MMHG | SYSTOLIC BLOOD PRESSURE: 119 MMHG

## 2020-05-15 VITALS — DIASTOLIC BLOOD PRESSURE: 87 MMHG | SYSTOLIC BLOOD PRESSURE: 138 MMHG

## 2020-05-15 VITALS — DIASTOLIC BLOOD PRESSURE: 57 MMHG | SYSTOLIC BLOOD PRESSURE: 104 MMHG

## 2020-05-15 VITALS — DIASTOLIC BLOOD PRESSURE: 66 MMHG | SYSTOLIC BLOOD PRESSURE: 111 MMHG

## 2020-05-15 VITALS — DIASTOLIC BLOOD PRESSURE: 76 MMHG | SYSTOLIC BLOOD PRESSURE: 141 MMHG

## 2020-05-15 VITALS — DIASTOLIC BLOOD PRESSURE: 59 MMHG | SYSTOLIC BLOOD PRESSURE: 105 MMHG

## 2020-05-15 VITALS — DIASTOLIC BLOOD PRESSURE: 67 MMHG | SYSTOLIC BLOOD PRESSURE: 115 MMHG

## 2020-05-15 LAB
ANION GAP SERPL CALC-SCNC: 10 MMOL/L (ref 7–16)
ANION GAP SERPL CALC-SCNC: 11 MMOL/L (ref 7–16)
APTT BLD: 27.8 SECONDS (ref 24.5–32.8)
APTT BLD: 28.1 SECONDS (ref 24.5–32.8)
BASE EXCESS STD BLDA CALC-SCNC: 0 MMOL/L
BASE EXCESS STD BLDA CALC-SCNC: 0 MMOL/L
BE(VIVO): -2 MMOL/L
BE(VIVO): -4.3 MMOL/L
BUN SERPL-MCNC: 18 MG/DL (ref 7–18)
BUN SERPL-MCNC: 19 MG/DL (ref 7–18)
CALCIUM SERPL-MCNC: 7.6 MG/DL (ref 8.5–10.1)
CALCIUM SERPL-MCNC: 7.9 MG/DL (ref 8.5–10.1)
CHLORIDE SERPL-SCNC: 104 MMOL/L (ref 98–107)
CHLORIDE SERPL-SCNC: 105 MMOL/L (ref 98–107)
CO2 SERPL-SCNC: 25 MMOL/L (ref 21–32)
CO2 SERPL-SCNC: 25 MMOL/L (ref 21–32)
CREAT SERPL-MCNC: 1 MG/DL (ref 0.7–1.3)
CREAT SERPL-MCNC: 1 MG/DL (ref 0.7–1.3)
ERYTHROCYTE [DISTWIDTH] IN BLOOD BY AUTOMATED COUNT: 13.6 % (ref 10.5–14.5)
ERYTHROCYTE [DISTWIDTH] IN BLOOD BY AUTOMATED COUNT: 13.7 % (ref 10.5–14.5)
FIBRINOGEN PPP-MCNC: 165.7 MG/DL (ref 210–360)
GLUCOSE BLD-MCNC: 136 MG/DL (ref 70–99)
GLUCOSE BLD-MCNC: 167 MG/DL (ref 70–99)
GLUCOSE SERPL-MCNC: 121 MG/DL (ref 74–106)
GLUCOSE SERPL-MCNC: 200 MG/DL (ref 74–106)
HCO3 BLD-SCNC: 19.9 MMOL/L (ref 22–26)
HCO3 BLD-SCNC: 22.4 MMOL/L (ref 22–26)
HCO3 BLDA-SCNC: 24.5 MMOL/L (ref 22–26)
HCO3 BLDA-SCNC: 25 MMOL/L (ref 22–26)
HCT VFR BLD AUTO: 27 % (ref 42–52)
HCT VFR BLD AUTO: 28 % (ref 42–52)
HCT VFR BLD CALC: 28 % (ref 42–52)
HCT VFR BLD CALC: 29.5 % (ref 42–52)
HGB BLD-MCNC: 10.1 GM/DL (ref 14–18)
HGB BLD-MCNC: 9.2 G/DL (ref 14–18)
HGB BLD-MCNC: 9.5 G/DL (ref 14–18)
HGB BLD-MCNC: 9.6 GM/DL (ref 14–18)
INR PPP: 1.2
INR PPP: 1.5
MAGNESIUM SERPL-MCNC: 2 MG/DL (ref 1.8–2.4)
MAGNESIUM SERPL-MCNC: 2.4 MG/DL (ref 1.8–2.4)
MCH RBC QN AUTO: 31.2 PG (ref 26–34)
MCH RBC QN AUTO: 31.2 PG (ref 26–34)
MCHC RBC AUTO-ENTMCNC: 34.2 G/DL (ref 28–37)
MCHC RBC AUTO-ENTMCNC: 34.2 G/DL (ref 28–37)
MCV RBC: 91.2 FL (ref 80–100)
MCV RBC: 91.3 FL (ref 80–100)
PCO2 BLD: 33.6 MMHG (ref 35–45)
PCO2 BLD: 36.7 MMHG (ref 35–45)
PCO2 BLDA: 39.6 MMHG (ref 35–45)
PCO2 BLDA: 43.7 MMHG (ref 35–45)
PH BLDA: 7.37 [PH] (ref 7.36–7.45)
PH BLDA: 7.4 [PH] (ref 7.36–7.45)
PLATELET # BLD: 156 THOU/UL (ref 150–400)
PLATELET # BLD: 183 THOU/UL (ref 150–400)
PO2 BLD: 105.7 MMHG (ref 80–100)
PO2 BLD: 94 MMHG (ref 80–100)
POC CA IONIZED: 4.3 MG/DL (ref 4.5–5.3)
POC CA IONIZED: 4.5 MG/DL (ref 4.5–5.3)
POTASSIUM SERPL-SCNC: 3.6 MMOL/L (ref 3.5–5.1)
POTASSIUM SERPL-SCNC: 3.6 MMOL/L (ref 3.5–5.1)
POTASSIUM SERPL-SCNC: 3.7 MMOL/L (ref 3.5–5.1)
POTASSIUM SERPL-SCNC: 5.4 MMOL/L (ref 3.5–5.1)
PROTHROMBIN TIME: 12.3 SECONDS (ref 9.3–11.4)
PROTHROMBIN TIME: 14.9 SECONDS (ref 9.3–11.4)
RBC # BLD AUTO: 3.07 MIL/UL (ref 4.5–6)
RBC # BLD AUTO: 3.23 MIL/UL (ref 4.5–6)
SODIUM SERPL-SCNC: 135 MMOL/L (ref 136–145)
SODIUM SERPL-SCNC: 139 MMOL/L (ref 136–145)
SODIUM SERPL-SCNC: 140 MMOL/L (ref 136–145)
SODIUM SERPL-SCNC: 140 MMOL/L (ref 136–145)
WBC # BLD AUTO: 13 THOU/UL (ref 4–11)
WBC # BLD AUTO: 16.6 THOU/UL (ref 4–11)

## 2020-05-15 PROCEDURE — 03HY32Z INSERTION OF MONITORING DEVICE INTO UPPER ARTERY, PERCUTANEOUS APPROACH: ICD-10-PCS | Performed by: THORACIC SURGERY (CARDIOTHORACIC VASCULAR SURGERY)

## 2020-05-15 PROCEDURE — 02100Z9 BYPASS CORONARY ARTERY, ONE ARTERY FROM LEFT INTERNAL MAMMARY, OPEN APPROACH: ICD-10-PCS | Performed by: THORACIC SURGERY (CARDIOTHORACIC VASCULAR SURGERY)

## 2020-05-15 PROCEDURE — 021209W BYPASS CORONARY ARTERY, THREE ARTERIES FROM AORTA WITH AUTOLOGOUS VENOUS TISSUE, OPEN APPROACH: ICD-10-PCS | Performed by: THORACIC SURGERY (CARDIOTHORACIC VASCULAR SURGERY)

## 2020-05-15 PROCEDURE — 06BQ4ZZ EXCISION OF LEFT SAPHENOUS VEIN, PERCUTANEOUS ENDOSCOPIC APPROACH: ICD-10-PCS | Performed by: THORACIC SURGERY (CARDIOTHORACIC VASCULAR SURGERY)

## 2020-05-15 PROCEDURE — 5A1221Z PERFORMANCE OF CARDIAC OUTPUT, CONTINUOUS: ICD-10-PCS | Performed by: THORACIC SURGERY (CARDIOTHORACIC VASCULAR SURGERY)

## 2020-05-15 NOTE — NUR
PT ARRIVED TO ICU FROM OR AT 1325 WITH NURSING STAFF, ANETHSTESIA, SURGEON AND
PA. PLACED OM MONITOR AND VIGILANCE. PM OFF. PROPOFOL GTT FOR VENT MANAGEMENT
PER TIFFANIE. CT TO SUCTION AND JOSE M HUGGER APPLIED. WAITING FOR MEDS FROM
PHARMACY.

## 2020-05-15 NOTE — O
[facility]
[address]
[place]   [address]                     OPERATIVE REPORT              
_______________________________________________________________________________
 
Name:       ANNI RANGEL                Room #:         250-P       ADM IN  
M.R.#:      [id number]                       Account #:      [id number]  
Admission:  05/15/20    Attend Phys:    Kirk Villar MD    
Discharge:              Date of Birth:  [birth year]  
                                                          Report #: [id number]
                                                                    [id number]   
_______________________________________________________________________________
THIS REPORT FOR:  
 
cc:  Fer Young James L. DO Forman,Kirk GIFFORD MD                                            ~
CC: Fer Villar
 
DATE OF SERVICE:  05/15/2020
 
 
PREOPERATIVE DIAGNOSIS:  Coronary artery disease.
 
POSTOPERATIVE DIAGNOSIS:  Coronary artery disease.
 
OPERATION:  Coronary artery bypass x 4 including left internal mammary artery to
left anterior descending artery, saphenous vein to diagonal, first marginal, and
second marginal arteries and endoscopic harvest, left greater saphenous vein.
 
SURGEON:  Kirk Villar MD
 
ASSISTANT:  ALLIE Nieto.
 
ANESTHESIA:  General.
 
INDICATIONS:  The patient is a 69-year-old with a recent history of the COVID in
March.  The patient has recovered from this and received a cardiac evaluation
for chest pain.  This shows important LAD and circumflex lesions.  The right
coronary had trivial disease.  Left ventricular function is satisfactory.
 
FINDINGS AND TECHNIQUE:  After general anesthesia was established, saphenous
vein was harvested using an endoscopic approach and prepared for use as a
conduit.
 
Exposure was obtained through median sternotomy.  Left internal mammary artery
was harvested from chest wall.  Pericardial well was made.  Cannulation sutures
were placed.  Heparin was given.  Aorta was cannulated.  Right atrium was
cannulated.  Cardioplegia needle was positioned in the aortic root.  Retrograde
cardioplegic catheter was placed in coronary sinus.  Cardiopulmonary bypass was
established.  The aorta was cross clamped.  Antegrade and retrograde
cardioplegia were given.  Ice was poured in the pericardial well.  The heart was
stopped.
 
During electromechanical arrest, the distal anastomoses were performed and
end-to-side anastomosis was made between vein and the large second marginal
artery.  Cold cardioplegia was given.  The same segment of vein was sewn in
 
 
 
[facility]
[address]
[place]   [address]                     OPERATIVE REPORT              
_______________________________________________________________________________
 
Name:       ANNI RANGEL SHON                Room #:         [address] [place] [address] [place]  
.R.#:      [id number]                       Account #:      [id number]  
Admission:  05/15/20    Attend Phys:    Kirk Villar MD    
Discharge:              Date of Birth:  [birth year]  
                                                          Report #: [id number]
                                                                    [id number]   
_______________________________________________________________________________
side-to-side fashion to the first marginal artery.  Cold cardioplegia was given.
 The same segment of vein was sewn in side-to-side fashion to the diagonal
artery before it bifurcated.  Cold cardioplegia was given.  Left internal
mammary artery was sewn in end-to-side fashion to left anterior descending
artery.  The patency of this vessel was checked with the temperature technique
and the Doppler.  Cold cardioplegia was given.  One proximal anastomosis was
performed.  When this was complete, warm retrograde cardioplegia was given
followed by warm continuous blood to the coronary sinus.  When this infusion was
complete, the crossclamp was removed, de-airing maneuvers were performed.  The
anastomoses were inspected and found to be satisfactory.
 
As the patient warmed, nice cardiac activity resumed, chest tubes and pacing
wires were placed, a marker was placed around the proximal anastomoses.
 
When the patient was warmed, she was weaned from cardiopulmonary bypass.  Venous
cannula was removed.  Protamine was given, the aortic cannula was removed. 
Flows were measured in the bypass grafts.
 
When hemostasis was satisfactory, chest was irrigated with antibiotic solution
and closed in the usual fashion.  The patient was taken to the Intensive Care
Unit in good condition having tolerated the procedure well.  All counts reported
as correct.
 
 
 
 
 
 
 
 
 
 
 
 
 
 
 
 
 
 
 
 
 
 
  <ELECTRONICALLY SIGNED>
   By: Kirk Villar MD            
  05/15/20     1912
D: 05/15/20 1548                           _____________________________________
T: 05/15/20 1602                           Kirk Villar MD              /nt

## 2020-05-15 NOTE — NUR
Care assumed at 1900.  Pt alert, oriented, c/o of pain between upper shoulder
blades and lower neck which he rates 8/10.  Sternal DIONNE drsg patent, has <
0.5 cm area of red drainage at uppermost end of drsg; inspected this with day
shift nurse and area has not changed since admission to ICU.  Mediastinal CTs
x2 and left plural CT x1 to -20 cm H2O, no air leak or crepitus.  Pt on 4 L O2
per canula, O2 sat 98 to 100%.  Dr. Villar here to see pt around 1900; IVPB
acetaminophen ordered for pain.  Pt repositioned for comfort.  Urine output
remains > 30 cc/hr and monitor remains sinus rhythm without ectopy.

## 2020-05-15 NOTE — NUR
PT REQUESTING TO TAKE OWN MEDICATION WHILE IN HOSP   DR MOE AWARE AND
MEDICATIONS TAKEN TO PHARMACY    DENTURES AND GLASSES IN PT'S RED BAG.

## 2020-05-16 VITALS — DIASTOLIC BLOOD PRESSURE: 58 MMHG | SYSTOLIC BLOOD PRESSURE: 119 MMHG

## 2020-05-16 VITALS — DIASTOLIC BLOOD PRESSURE: 53 MMHG | SYSTOLIC BLOOD PRESSURE: 119 MMHG

## 2020-05-16 VITALS — DIASTOLIC BLOOD PRESSURE: 61 MMHG | SYSTOLIC BLOOD PRESSURE: 119 MMHG

## 2020-05-16 VITALS — SYSTOLIC BLOOD PRESSURE: 126 MMHG | DIASTOLIC BLOOD PRESSURE: 59 MMHG

## 2020-05-16 VITALS — SYSTOLIC BLOOD PRESSURE: 128 MMHG | DIASTOLIC BLOOD PRESSURE: 59 MMHG

## 2020-05-16 LAB
ANION GAP SERPL CALC-SCNC: 10 MMOL/L (ref 7–16)
BUN SERPL-MCNC: 19 MG/DL (ref 7–18)
CALCIUM SERPL-MCNC: 7.8 MG/DL (ref 8.5–10.1)
CHLORIDE SERPL-SCNC: 103 MMOL/L (ref 98–107)
CO2 SERPL-SCNC: 24 MMOL/L (ref 21–32)
CREAT SERPL-MCNC: 0.8 MG/DL (ref 0.7–1.3)
ERYTHROCYTE [DISTWIDTH] IN BLOOD BY AUTOMATED COUNT: 13.6 % (ref 10.5–14.5)
GLUCOSE SERPL-MCNC: 133 MG/DL (ref 74–106)
HCT VFR BLD CALC: 31.3 % (ref 42–52)
HGB BLD-MCNC: 10.5 GM/DL (ref 14–18)
MCH RBC QN AUTO: 30.7 PG (ref 26–34)
MCHC RBC AUTO-ENTMCNC: 33.4 G/DL (ref 28–37)
MCV RBC: 91.8 FL (ref 80–100)
PLATELET # BLD: 217 THOU/UL (ref 150–400)
POTASSIUM SERPL-SCNC: 3.1 MMOL/L (ref 3.5–5.1)
RBC # BLD AUTO: 3.41 MIL/UL (ref 4.5–6)
SODIUM SERPL-SCNC: 137 MMOL/L (ref 136–145)
WBC # BLD AUTO: 16.9 THOU/UL (ref 4–11)

## 2020-05-16 PROCEDURE — 05HY33Z INSERTION OF INFUSION DEVICE INTO UPPER VEIN, PERCUTANEOUS APPROACH: ICD-10-PCS | Performed by: THORACIC SURGERY (CARDIOTHORACIC VASCULAR SURGERY)

## 2020-05-16 NOTE — NUR
Pt progressing toward goals.  Monitor remains sinus rhythm without ectopy.
SBP remains < 150 with Cardene gtt; a beginning of shift Cardene at 15 mg/hr
but able to titrate down to 5 mg/hr.  PA pressures 27-32/8-12, CO 7.5-8.5, CI
3.0- 4.1, -780.  Pain adequately controlled with 25-50 mcg Fentanyl IVP
q2-3 hours prn and scheduled IV Acetamenophen.  Pt initially on 4 L nasal
canula; able to titrate down to 2L wit sat > 93%.  Blood sugar kept between
100-150 with insulin gtt.  Urine output 600cc this shift. Taking clear liquids
without nausea.

## 2020-05-16 NOTE — NUR
DR. MOE HERE.  UPDATE GIVEN.  NO NEW ORDERS.  PT FEELING SICK TO HIS
STOMACH.  SALINE CRACKER , 7UP AND ZOFRAN GIVEN WITH GOOD RESULTS.

## 2020-05-17 VITALS — DIASTOLIC BLOOD PRESSURE: 64 MMHG | SYSTOLIC BLOOD PRESSURE: 109 MMHG

## 2020-05-17 VITALS — DIASTOLIC BLOOD PRESSURE: 57 MMHG | SYSTOLIC BLOOD PRESSURE: 103 MMHG

## 2020-05-17 VITALS — DIASTOLIC BLOOD PRESSURE: 60 MMHG | SYSTOLIC BLOOD PRESSURE: 118 MMHG

## 2020-05-17 VITALS — SYSTOLIC BLOOD PRESSURE: 101 MMHG | DIASTOLIC BLOOD PRESSURE: 72 MMHG

## 2020-05-17 VITALS — DIASTOLIC BLOOD PRESSURE: 62 MMHG | SYSTOLIC BLOOD PRESSURE: 114 MMHG

## 2020-05-17 VITALS — SYSTOLIC BLOOD PRESSURE: 105 MMHG | DIASTOLIC BLOOD PRESSURE: 54 MMHG

## 2020-05-17 VITALS — SYSTOLIC BLOOD PRESSURE: 116 MMHG | DIASTOLIC BLOOD PRESSURE: 62 MMHG

## 2020-05-17 VITALS — DIASTOLIC BLOOD PRESSURE: 65 MMHG | SYSTOLIC BLOOD PRESSURE: 124 MMHG

## 2020-05-17 VITALS — DIASTOLIC BLOOD PRESSURE: 64 MMHG | SYSTOLIC BLOOD PRESSURE: 123 MMHG

## 2020-05-17 VITALS — DIASTOLIC BLOOD PRESSURE: 58 MMHG | SYSTOLIC BLOOD PRESSURE: 123 MMHG

## 2020-05-17 VITALS — SYSTOLIC BLOOD PRESSURE: 119 MMHG | DIASTOLIC BLOOD PRESSURE: 63 MMHG

## 2020-05-17 VITALS — DIASTOLIC BLOOD PRESSURE: 60 MMHG | SYSTOLIC BLOOD PRESSURE: 120 MMHG

## 2020-05-17 LAB
ANION GAP SERPL CALC-SCNC: 8 MMOL/L (ref 7–16)
BE(VIVO): -3.4 MMOL/L
BUN SERPL-MCNC: 18 MG/DL (ref 7–18)
CALCIUM SERPL-MCNC: 7.7 MG/DL (ref 8.5–10.1)
CHLORIDE SERPL-SCNC: 100 MMOL/L (ref 98–107)
CO2 SERPL-SCNC: 26 MMOL/L (ref 21–32)
CREAT SERPL-MCNC: 0.8 MG/DL (ref 0.7–1.3)
ERYTHROCYTE [DISTWIDTH] IN BLOOD BY AUTOMATED COUNT: 13.9 % (ref 10.5–14.5)
GLUCOSE SERPL-MCNC: 148 MG/DL (ref 74–106)
HCO3 BLD-SCNC: 20.1 MMOL/L (ref 22–26)
HCT VFR BLD CALC: 30.9 % (ref 42–52)
HGB BLD-MCNC: 10.4 GM/DL (ref 14–18)
MCH RBC QN AUTO: 31.1 PG (ref 26–34)
MCHC RBC AUTO-ENTMCNC: 33.5 G/DL (ref 28–37)
MCV RBC: 92.7 FL (ref 80–100)
PCO2 BLD: 31 MMHG (ref 35–45)
PLATELET # BLD: 205 THOU/UL (ref 150–400)
PO2 BLD: 66.1 MMHG (ref 80–100)
POTASSIUM SERPL-SCNC: 3.9 MMOL/L (ref 3.5–5.1)
RBC # BLD AUTO: 3.33 MIL/UL (ref 4.5–6)
SODIUM SERPL-SCNC: 134 MMOL/L (ref 136–145)
WBC # BLD AUTO: 20 THOU/UL (ref 4–11)

## 2020-05-17 NOTE — NUR
Pt making slow progress toward discharge goals.  Still c/o frequently of pain
in upper back, sternal incision, and headache; pt usually gives rating of 7-9.
 Educated pt regarding side effects of narcotics such as depressed breathing
and constipation.  Education will need to be reinforced.
O2 increased from 2 L to 5 L due to desaturaion down to 86-88% when pt
sleeping.  Able to titrate Cardene gtt off by 0200; SBP remains 130-148.
Monitor remains sinus rhythm withou ectopy.  Urine output 650 cc for this
shift.  Mediastinal CT x2 patent to -20, 70 cc sero-sanguinous drainage; left
plural CT x1 patent o -20, 130 cc sero-sanguinous drainage.

## 2020-05-17 NOTE — NUR
CONTINUES TO IMPROVE AND PROGRESS TOWARD GOALS.  AMBULATED TWICE AROUND [facility]. RIJ INTRODUCER DC'D, FLOREZ DC'D, MEDIASTINAL CT X 2 REMOVED.
APPETITIE IMPROVED SLIGHTLY, ATE 50% OF LUNCH AND 25% OF DINNER.  TAKING PO
LIQUIDS WELL.  PAIN CONTROLLED WITH PO HYDROCODONE.  NO NAUSEA OR CHEST PAIN.
L PLEUAL CT TO 20CM SUCTION, SEROSANGUINAS FLUID. NO AIR LEAK

## 2020-05-18 VITALS — DIASTOLIC BLOOD PRESSURE: 52 MMHG | SYSTOLIC BLOOD PRESSURE: 101 MMHG

## 2020-05-18 VITALS — DIASTOLIC BLOOD PRESSURE: 66 MMHG | SYSTOLIC BLOOD PRESSURE: 122 MMHG

## 2020-05-18 VITALS — SYSTOLIC BLOOD PRESSURE: 94 MMHG | DIASTOLIC BLOOD PRESSURE: 51 MMHG

## 2020-05-18 VITALS — DIASTOLIC BLOOD PRESSURE: 53 MMHG | SYSTOLIC BLOOD PRESSURE: 107 MMHG

## 2020-05-18 VITALS — SYSTOLIC BLOOD PRESSURE: 142 MMHG | DIASTOLIC BLOOD PRESSURE: 53 MMHG

## 2020-05-18 VITALS — DIASTOLIC BLOOD PRESSURE: 57 MMHG | SYSTOLIC BLOOD PRESSURE: 103 MMHG

## 2020-05-18 VITALS — SYSTOLIC BLOOD PRESSURE: 93 MMHG | DIASTOLIC BLOOD PRESSURE: 53 MMHG

## 2020-05-18 VITALS — SYSTOLIC BLOOD PRESSURE: 124 MMHG | DIASTOLIC BLOOD PRESSURE: 58 MMHG

## 2020-05-18 VITALS — DIASTOLIC BLOOD PRESSURE: 53 MMHG | SYSTOLIC BLOOD PRESSURE: 113 MMHG

## 2020-05-18 VITALS — SYSTOLIC BLOOD PRESSURE: 115 MMHG | DIASTOLIC BLOOD PRESSURE: 57 MMHG

## 2020-05-18 VITALS — SYSTOLIC BLOOD PRESSURE: 105 MMHG | DIASTOLIC BLOOD PRESSURE: 51 MMHG

## 2020-05-18 VITALS — DIASTOLIC BLOOD PRESSURE: 65 MMHG | SYSTOLIC BLOOD PRESSURE: 117 MMHG

## 2020-05-18 VITALS — SYSTOLIC BLOOD PRESSURE: 102 MMHG | DIASTOLIC BLOOD PRESSURE: 57 MMHG

## 2020-05-18 VITALS — SYSTOLIC BLOOD PRESSURE: 97 MMHG | DIASTOLIC BLOOD PRESSURE: 58 MMHG

## 2020-05-18 VITALS — SYSTOLIC BLOOD PRESSURE: 94 MMHG | DIASTOLIC BLOOD PRESSURE: 55 MMHG

## 2020-05-18 VITALS — SYSTOLIC BLOOD PRESSURE: 108 MMHG | DIASTOLIC BLOOD PRESSURE: 55 MMHG

## 2020-05-18 VITALS — SYSTOLIC BLOOD PRESSURE: 96 MMHG | DIASTOLIC BLOOD PRESSURE: 52 MMHG

## 2020-05-18 VITALS — DIASTOLIC BLOOD PRESSURE: 59 MMHG | SYSTOLIC BLOOD PRESSURE: 107 MMHG

## 2020-05-18 VITALS — DIASTOLIC BLOOD PRESSURE: 90 MMHG | SYSTOLIC BLOOD PRESSURE: 128 MMHG

## 2020-05-18 VITALS — DIASTOLIC BLOOD PRESSURE: 52 MMHG | SYSTOLIC BLOOD PRESSURE: 107 MMHG

## 2020-05-18 NOTE — NUR
PT ALERT AND ORIENTED. PT RESTED IN THE CHAIR UNTIL 5 AM THIS MORNING.
PT'S PAIN WAS TREATED WITH PRN HYDROCODONE. URIE OUPUT IS ADEQUATE. PT ON 3L
NC AND MINIMAL DRAINAGE OUTOUT FROM THE CHEST TUBE.CONTINUE TO MONITOR. PT
PROGRESSING TOWARDS GOALS.

## 2020-05-18 NOTE — NUR
CABG POD #3
LEFT PLEURAL CT AND PACER WIRES REMOVED BY ALLIE GARZA THIS AM. CHEST
XRAY DONE FOLLOWING. REMAINED ON BEDREST FOR 1 HR FOLLOWING REMOVAL. RAD ERASMO
DISCONTINUED. PT UP TO CHAIR FOR MOST OF THE DAY. AMBULATED IN HALLWAY WITH
PHYSICAL THERAPY X2 TODAY. O2 WEANED TO OFF AND O2 SATS STABLE ON ROOM AIR.
USING I.S. 10X PER HOUR UP TO 1000ML. SOME FINE CRACKLES HEARD IN BASES.
ENCOURAGED USE OF I.S., COUGHING, DEEP BREATHING. POOR APPETITE TODAY. PT
COMPLAINING OF NECK AND STERNUM PAIN. HYDROCODONE GIVEN WITH GOOD PAIN RELIEF.
ORDER TO TRANSFER TO CCU. REPORT CALLED TO ABI COSTA. CALLED AND UPDATED PT'S
WIFE DAVID AND INFORMED HER OF TRANSFER. PROGRESSING TOWARDS GOALS PER PLAN
OF CARE.

## 2020-05-18 NOTE — NUR
PT TRANSFER FROM ICU APPROXIMATELY 1700. ALERT X4, DENIES SOB, NON-CARDIAC
PAIN TREATED PRIOR TO TRANSFER TO CARDIAC UNIT. SITTING IN CHAIR, REPORTING
PAIN 4/10 THAT IS MANAGABLE. PICCO DRESSING INTACT. CALL LIGHT AND PERSONAL
ITEMS IN REACH. NSR ON TELE. VS STABLE. PT UNDERSTANDS THE NEED TO CALL FOR
ASSISTANCE.

## 2020-05-18 NOTE — EKG
[facility]
[place]   [address]                     ELECTROCARDIOGRAM REPORT      
_______________________________________________________________________________
 
Name:       ANNI RANGEL SHON                Room #:         250-P       ADM IN  
M.R.#:      [id number]                       Account #:      [id number]  
Admission:  05/15/20    Attend Phys:    Kirk Villar MD    
Discharge:              Date of Birth:  [birth year]  
                                                          Report #: [id number]
                                                                    [id number]
_______________________________________________________________________________
THIS REPORT FOR:  
 
cc:  Fer Young James L. DO Lundgren, Craig H. MD [facility]                                        ~
THIS REPORT FOR:   //name//                          
 
                          [facility]
                                       
Test Date:    2020-05-15               Test Time:    14:28:24
Pat Name:     ANNI RANGEL             Department:   
Patient ID:   [id number]            Room:         250 P
Gender:       M                        Technician:   EMILIE HEREDIA
:          1950               Requested By: Wilmer Polo
Order Number: [id number] MD:   Luciano Ryan
                                 Measurements
Intervals                              Axis          
Rate:         82                       P:            28
AR:           165                      QRS:          18
QRSD:         117                      T:            -9
QT:           442                                    
QTc:          517                                    
                           Interpretive Statements
Sinus rhythm
Early R wave progression
Nonspecific ST segment abnormality
Compared to ECG 04/15/2006 07:12:45
ST segment abnormality is more pronounced
Inferior Q waves are no longer present
Electronically Signed On 2020 7:50:18 CDT by Luciano Ryan
[email or web address]
 
 
 
 
 
 
 
 
 
 
 
 
 
  <ELECTRONICALLY SIGNED>
   By: Luciano Ryan MD, [facility]   
  20     0750
D: 05/15/20 1428                           _____________________________________
T: 05/15/20 1428                           Luciano Ryan MD, FAC     /EPI

## 2020-05-18 NOTE — NUR
RD consult received for diet education.  S/P CABG x 4 on 5/15.  Diet has
advanced.  Will await transfer out of ICU and address nutrition education
needs.

## 2020-05-18 NOTE — EKG
[facility]
[place]   [address]                     ELECTROCARDIOGRAM REPORT      
_______________________________________________________________________________
 
Name:       ANNI RANGEL                Room #:         250-P       ADM IN  
M.R.#:      [id number]                       Account #:      [id number]  
Admission:  05/15/20    Attend Phys:    Kirk Villar MD    
Discharge:              Date of Birth:  [birth year]  
                                                          Report #: [id number]
                                                                    [id number]
_______________________________________________________________________________
THIS REPORT FOR:  
 
cc:  Fer Young James L. DO Lundgren,Luciano LAYTON MD [facility]                                        ~
THIS REPORT FOR:   //name//                          
 
                          [facility]
                                       
Test Date:    2020               Test Time:    07:29:48
Pat Name:     ANNI RANGEL             Department:   
Patient ID:   [id number]            Room:         250 P
Gender:       M                        Technician:   RANDY RUVALCABA
:          1950               Requested By: Wilmer Polo
Order Number: [id number] MD:   Luciano Ryan
                                 Measurements
Intervals                              Axis          
Rate:         71                       P:            24
MI:           138                      QRS:          -10
QRSD:         110                      T:            28
QT:           445                                    
QTc:          484                                    
                           Interpretive Statements
Sinus rhythm
Abnormal R-wave progression, early transition
Inferior infarct, old
Compared to ECG 04/15/2006 07:12:45
Inferior Q waves are now present
ST segment abnormality is less pronounced
Electronically Signed On 2020 7:54:05 CDT by Luciano Ryan
[email or web address]
 
 
 
 
 
 
 
 
 
 
 
 
 
  <ELECTRONICALLY SIGNED>
   By: Luciano Ryan MD, FAC   
  20     0754
D: 20 0729                           _____________________________________
T: 20 0729                           Luciano Ryan MD, FACC     /EPI

## 2020-05-19 VITALS — DIASTOLIC BLOOD PRESSURE: 77 MMHG | SYSTOLIC BLOOD PRESSURE: 130 MMHG

## 2020-05-19 VITALS — SYSTOLIC BLOOD PRESSURE: 131 MMHG | DIASTOLIC BLOOD PRESSURE: 53 MMHG

## 2020-05-19 VITALS — SYSTOLIC BLOOD PRESSURE: 121 MMHG | DIASTOLIC BLOOD PRESSURE: 79 MMHG

## 2020-05-19 VITALS — SYSTOLIC BLOOD PRESSURE: 117 MMHG | DIASTOLIC BLOOD PRESSURE: 70 MMHG

## 2020-05-19 VITALS — DIASTOLIC BLOOD PRESSURE: 53 MMHG | SYSTOLIC BLOOD PRESSURE: 120 MMHG

## 2020-05-19 LAB
ANION GAP SERPL CALC-SCNC: 6 MMOL/L (ref 7–16)
BF MACROPHAGE: 29
BF NEUTROPHILS: 62
BF NUCLEATED CELLS: 1205
BF RBC: (no result)
BUN SERPL-MCNC: 14 MG/DL (ref 7–18)
CALCIUM SERPL-MCNC: 7.9 MG/DL (ref 8.5–10.1)
CHLORIDE SERPL-SCNC: 99 MMOL/L (ref 98–107)
CLARITY UR: (no result)
CO2 SERPL-SCNC: 30 MMOL/L (ref 21–32)
COLOR UR: (no result)
CREAT SERPL-MCNC: 0.8 MG/DL (ref 0.7–1.3)
ERYTHROCYTE [DISTWIDTH] IN BLOOD BY AUTOMATED COUNT: 14 % (ref 10.5–14.5)
GLUCOSE SERPL-MCNC: 111 MG/DL (ref 74–106)
HCT VFR BLD CALC: 28.3 % (ref 42–52)
HGB BLD-MCNC: 9.6 GM/DL (ref 14–18)
MCH RBC QN AUTO: 31.3 PG (ref 26–34)
MCHC RBC AUTO-ENTMCNC: 33.9 G/DL (ref 28–37)
MCV RBC: 92.3 FL (ref 80–100)
PLATELET # BLD: 200 THOU/UL (ref 150–400)
POTASSIUM SERPL-SCNC: 4.1 MMOL/L (ref 3.5–5.1)
RBC # BLD AUTO: 3.07 MIL/UL (ref 4.5–6)
SODIUM SERPL-SCNC: 135 MMOL/L (ref 136–145)
SOURCE: (no result)
SOURCE: (no result)
SPECIMEN VOL 24H UR: 60 ML
WBC # BLD AUTO: 9.2 THOU/UL (ref 4–11)

## 2020-05-19 PROCEDURE — 0W993ZZ DRAINAGE OF RIGHT PLEURAL CAVITY, PERCUTANEOUS APPROACH: ICD-10-PCS | Performed by: THORACIC SURGERY (CARDIOTHORACIC VASCULAR SURGERY)

## 2020-05-19 NOTE — NUR
pt resting quietly in room, vss, IS to 1000, prn pain mrds given for HA and
incisional pain, picco dressing remains intact, pt repositions self in bed,
arm elevated up on pillows, will con't to monitor per ppoc.

## 2020-05-19 NOTE — EKG
[facility]
[place]   [address]                     ELECTROCARDIOGRAM REPORT      
_______________________________________________________________________________
 
Name:       ANNI RANGEL                Room #:         218-P       ADM IN  
M.R.#:      [id number]                       Account #:      [id number]  
Admission:  05/15/20    Attend Phys:    Kirk Villar MD    
Discharge:              Date of Birth:  [birth year]  
                                                          Report #: [id number]
                                                                    [id number]
_______________________________________________________________________________
THIS REPORT FOR:  
 
cc:  Fer Young James L. DO Lundgren, Craig H. MD [facility]                                        ~
THIS REPORT FOR:   //name//                          
 
                          [facility]
                                       
Test Date:    2020               Test Time:    07:13:33
Pat Name:     ANNI RANGEL             Department:   
Patient ID:   [id number]            Room:         218 P
Gender:       M                        Technician:   RANDY RUVALCABA
:          1950               Requested By: Wilmer Polo
Order Number: [id number] MD:   Luciano Ryan
                                 Measurements
Intervals                              Axis          
Rate:         69                       P:            30
DE:           180                      QRS:          -7
QRSD:         110                      T:            12
QT:           427                                    
QTc:          458                                    
                           Interpretive Statements
Sinus rhythm
RSR' in V1 or V2, right VCD 
Cannot rule out inferior infarct, age indeterminate
Compared to ECG 2020 07:29:48
No significant change was found
Electronically Signed On 2020 7:55:39 CDT by Luciano Ryan
[email or web address]
 
 
 
 
 
 
 
 
 
 
 
 
 
 
  <ELECTRONICALLY SIGNED>
   By: Luciano Ryan MD, FAC   
  20     0755
D: 2013                           _____________________________________
T: 20 0713                           Luciano Ryan MD, FACC     /EPI

## 2020-05-19 NOTE — NUR
ASSUMED CARE 0700. ALERT X4, CHRONIC PAIN MANAGED WITH MEDICATIONS, 2L NASAL
CANNULA. CHEST XRAY COMPLETED TODAY. THORASENTESIS TODAY 350ML DRAINED.
DRESSINGS REMOVED TODAY AND PT HAD A SHOWER WITH OCCUPATIONAL THEARAPY. ISAAC KELLEY RE-APPLIED DRESSING PICCO TO STERNAL INCISION AND BAND-AIDS TO CHEST TUBU
SIGHTS. NRS ON TELE. POOR NUTRIONAL INTAKE TODAY. FLUIDS ENCOURAGED. NO BM
NOTED AT THIS TIME. PT CALLS FOR ASSISTANCE. ENCOURAGED IS.

## 2020-05-20 VITALS — SYSTOLIC BLOOD PRESSURE: 126 MMHG | DIASTOLIC BLOOD PRESSURE: 70 MMHG

## 2020-05-20 VITALS — DIASTOLIC BLOOD PRESSURE: 72 MMHG | SYSTOLIC BLOOD PRESSURE: 133 MMHG

## 2020-05-20 VITALS — SYSTOLIC BLOOD PRESSURE: 146 MMHG | DIASTOLIC BLOOD PRESSURE: 72 MMHG

## 2020-05-20 VITALS — DIASTOLIC BLOOD PRESSURE: 61 MMHG | SYSTOLIC BLOOD PRESSURE: 147 MMHG

## 2020-05-20 VITALS — SYSTOLIC BLOOD PRESSURE: 124 MMHG | DIASTOLIC BLOOD PRESSURE: 64 MMHG

## 2020-05-20 LAB
ALBUMIN FLD-MCNC: 2 G/DL
AMYLASE FLD-CCNC: 18 U/L
BODY FLUID LDH: 263 IU/L
GLUCOSE FLD-MCNC: 113 MG/DL
PROT FLD-MCNC: 3.3 G/DL

## 2020-05-20 NOTE — NUR
RECEIVED PT'S CARE AROUND 0736; PT. UP IN THE RESTHROOM; ALERT; EDUCATED ABOUT
FALL PREVENTIONS; ST. UNDERSTANDING; ST. "WHEN YOU HAVE TO GO YOU HAVE TO GO";
DURING AM ASSESSMENT NO C/O PAIN; UP ON THE CHAIR; EDUCATED ABOUT THE
IMPORTANCE OF CALLING BEFORE STANDING UP; REMAINED ABOUT NOT PULLING OR
PUSHING; USING PILLOW; ST. UNDERSTANDING; AM MEDICATIONS GIVEN; ABLE TO WALKED
WITH PT & CARDIOLOGIST REHAB NURSE; O2 TITRATE TO RA; 94%; PER PT. PT'S 02
ABOVE 90% WHILE AMBULATING; SR ON THE MONITOR; ASSESSMENT AS CHARGED;
FOLLOWING POC; WILL PASS ON REPORT;

## 2020-05-20 NOTE — NUR
PATIENTS CARES WERE ASSUMED AT SHIFT CHANGE. PATIENT WAS ASSESSED AND MEDS
WERE PASSED. PATIENT DID C/O PAIN AND TYLENOL WAS GIVEN. UP TO VOID A FEW
TIMES. PATIENT DID SLEEP ABOUT SEVEN TO EIGHT HOURS THIS SHIFT. HOURLY ROUNDS
WERE MADE. THE BED IS IN A LOW AND LOCKED POSITION.

## 2020-05-21 VITALS — DIASTOLIC BLOOD PRESSURE: 82 MMHG | SYSTOLIC BLOOD PRESSURE: 147 MMHG

## 2020-05-21 VITALS — SYSTOLIC BLOOD PRESSURE: 151 MMHG | DIASTOLIC BLOOD PRESSURE: 84 MMHG

## 2020-05-21 VITALS — DIASTOLIC BLOOD PRESSURE: 54 MMHG | SYSTOLIC BLOOD PRESSURE: 110 MMHG

## 2020-05-21 VITALS — DIASTOLIC BLOOD PRESSURE: 80 MMHG | SYSTOLIC BLOOD PRESSURE: 131 MMHG

## 2020-05-21 VITALS — DIASTOLIC BLOOD PRESSURE: 92 MMHG | SYSTOLIC BLOOD PRESSURE: 149 MMHG

## 2020-05-21 NOTE — NUR
RECEIVED PT'S CARE AROUND 0720; PT. ON BED; AOX4; DURING AM ASSESSMENT NO C/O
PAIN; AM MEDICATIONS GIVEN; EDUCATED ABOUT NEW MEDICATIONS; ST. UNDERSTANDING;
SR ON THE MONITOR; EDUCATED ABOUT USING URINAL TO MEASURE URINE OUTPUT; ST.
UNDERSTANDING; EDUCATED ABOUT FALL PREVENTIONS; ST. UNDERSTANDING; NEEDS TO BE
REMAINED ABOUT IT; EDUCATED ABOUT D/C PROCESS; ST. UNDERSTANDING; PER KANG
D/C DIONNE DRESSING BEFORE PT D/C; PER PT. RIDE WILL ARRIVE AT 1300; ASSESSMENT
AS CHARGED; FOLLOWING POC; MONITORING;

## 2020-05-21 NOTE — PATH
[facility]
[address] [place]   [address]                     PATHOLOGY RPT PROCEDURE       
_______________________________________________________________________________
 
Name:       ANNI RANGEL                Room #:         218-P       [facility] [place]  
[facility].#:      [id number]     Account #:      [id number]  
Admission:  05/15/20    Date of Birth:  [birth year]  
Discharge:  05/21/20                                    Report #:    [id number]
                                                        Path Case #: [id number]
_______________________________________________________________________________
Note
[facility] Accession Number: [id number]
   TESTS               RESULT  FLAG  UNITS    REF RANGE  LAB
------------------------------------------------------------
   Clinician Provided Cytology Information
   No. of containers..01 Other (Miscellaneous)
Source:                                                   01
   PLEURAL
DIAGNOSIS:                                                02
   PLEURAL
   NEGATIVE FOR MALIGNANT EPITHELIAL CELLS.
   MESOTHELIAL CELLS ARE PRESENT.
   THIS INTERPRETATION INCLUDES EVALUATION OF A CELL BLOCK.
Pathologist ICD10:                                        02
   I25.810
Signed out by:                                            02
   Candice Latif MD, Pathologist
   NPI- [id number]
Performed by:                                             01
   Maia Greenwood Cytotechnologist ([facility])
Gross description:                                        01
   20 ML, CLOUDY RED/ORNG, 1 TP 1 CB
   /LCS  05/20/2020  1207 Local
 
------------------------------------------------------------
    FLAG LEGEND:
    L-Low Normal,H-High Normal,LL-Alert Low,HH-Alert High
    <-Panic Low,>-Panic High,A-Abnormal,AA-Critical Abnormal
------------------------------------------------------------
 
Performed at:
[address]
   [place]  [address]
   Igor Zamora MD, Phone: [phone]
[address]  [address]
   Candice Latif MD, Phone: [phone]
Specimen Comment: A courtesy copy of this report has been sent to [phone],
[phone]-
Specimen Comment: [phone]
Specimen Comment: Report sent to  / DR URBANO
Specimen Comment: A duplicate report has been generated due to demographic
updates.
***Performed at:  01
   [address], [place], [place]  [id number]
   MD Igor Zamora MD Phone:  [phone]

## 2020-05-21 NOTE — NUR
Pt dcing home today. Cardiac rehab has made a referral to the program in
[place] per pt request it to be closer to home. They will call him to
coordinate his first appt. No hh or dme indicated. case closed.

## 2020-06-29 ENCOUNTER — HOSPITAL ENCOUNTER (OUTPATIENT)
Dept: HOSPITAL 35 - SJCVC | Age: 70
End: 2020-06-29
Attending: INTERNAL MEDICINE
Payer: COMMERCIAL

## 2020-06-29 DIAGNOSIS — E78.00: ICD-10-CM

## 2020-06-29 DIAGNOSIS — U07.1: ICD-10-CM

## 2020-06-29 DIAGNOSIS — I45.10: ICD-10-CM

## 2020-06-29 DIAGNOSIS — I25.810: ICD-10-CM

## 2020-06-29 DIAGNOSIS — I10: ICD-10-CM

## 2020-06-29 DIAGNOSIS — R94.31: Primary | ICD-10-CM

## 2020-10-19 ENCOUNTER — HOSPITAL ENCOUNTER (OUTPATIENT)
Dept: HOSPITAL 35 - SJCVCIMAG | Age: 70
End: 2020-10-19
Attending: INTERNAL MEDICINE
Payer: COMMERCIAL

## 2020-10-19 DIAGNOSIS — I25.810: Primary | ICD-10-CM

## 2020-10-19 DIAGNOSIS — Z79.899: ICD-10-CM

## 2020-10-19 DIAGNOSIS — Z87.891: ICD-10-CM

## 2020-10-19 DIAGNOSIS — I11.9: ICD-10-CM

## 2021-02-04 ENCOUNTER — HOSPITAL ENCOUNTER (OUTPATIENT)
Dept: HOSPITAL 35 - SJCVCIMAG | Age: 71
End: 2021-02-04
Attending: INTERNAL MEDICINE
Payer: COMMERCIAL

## 2021-02-04 DIAGNOSIS — I25.810: ICD-10-CM

## 2021-02-04 DIAGNOSIS — Z79.82: ICD-10-CM

## 2021-02-04 DIAGNOSIS — Z87.891: ICD-10-CM

## 2021-02-04 DIAGNOSIS — I34.0: Primary | ICD-10-CM

## 2021-02-04 DIAGNOSIS — R94.31: ICD-10-CM

## 2021-02-04 DIAGNOSIS — Z90.49: ICD-10-CM

## 2021-02-04 DIAGNOSIS — R00.2: ICD-10-CM

## 2021-02-04 DIAGNOSIS — Z98.890: ICD-10-CM

## 2021-02-04 DIAGNOSIS — Z95.5: ICD-10-CM

## 2021-02-04 DIAGNOSIS — I10: ICD-10-CM

## 2021-02-04 DIAGNOSIS — E78.5: ICD-10-CM

## 2021-02-04 DIAGNOSIS — R53.83: ICD-10-CM

## 2021-02-04 DIAGNOSIS — E78.00: ICD-10-CM

## 2021-02-04 DIAGNOSIS — R00.0: ICD-10-CM

## 2021-03-01 ENCOUNTER — HOSPITAL ENCOUNTER (OUTPATIENT)
Dept: HOSPITAL 35 - SJCVC | Age: 71
End: 2021-03-01
Attending: INTERNAL MEDICINE
Payer: COMMERCIAL

## 2021-03-01 DIAGNOSIS — E78.00: ICD-10-CM

## 2021-03-01 DIAGNOSIS — I49.3: ICD-10-CM

## 2021-03-01 DIAGNOSIS — R94.31: ICD-10-CM

## 2021-03-01 DIAGNOSIS — I25.10: ICD-10-CM

## 2021-03-01 DIAGNOSIS — I45.19: ICD-10-CM

## 2021-03-01 DIAGNOSIS — Z72.89: ICD-10-CM

## 2021-03-01 DIAGNOSIS — R06.09: ICD-10-CM

## 2021-03-01 DIAGNOSIS — Z79.899: ICD-10-CM

## 2021-03-01 DIAGNOSIS — I10: ICD-10-CM

## 2021-03-01 DIAGNOSIS — Z95.1: ICD-10-CM

## 2021-03-01 DIAGNOSIS — I45.10: Primary | ICD-10-CM

## 2021-03-01 DIAGNOSIS — Z79.82: ICD-10-CM

## 2021-03-01 DIAGNOSIS — Z87.891: ICD-10-CM

## 2021-03-11 ENCOUNTER — HOSPITAL ENCOUNTER (OUTPATIENT)
Dept: HOSPITAL 35 - CATH | Age: 71
Discharge: HOME | End: 2021-03-11
Attending: INTERNAL MEDICINE
Payer: COMMERCIAL

## 2021-03-11 VITALS — DIASTOLIC BLOOD PRESSURE: 68 MMHG | SYSTOLIC BLOOD PRESSURE: 114 MMHG

## 2021-03-11 VITALS — WEIGHT: 208 LBS | BODY MASS INDEX: 31.52 KG/M2 | HEIGHT: 68 IN

## 2021-03-11 VITALS — DIASTOLIC BLOOD PRESSURE: 65 MMHG | SYSTOLIC BLOOD PRESSURE: 119 MMHG

## 2021-03-11 DIAGNOSIS — I48.91: ICD-10-CM

## 2021-03-11 DIAGNOSIS — Z95.1: ICD-10-CM

## 2021-03-11 DIAGNOSIS — I10: ICD-10-CM

## 2021-03-11 DIAGNOSIS — Z98.52: ICD-10-CM

## 2021-03-11 DIAGNOSIS — Z90.49: ICD-10-CM

## 2021-03-11 DIAGNOSIS — E78.5: ICD-10-CM

## 2021-03-11 DIAGNOSIS — Z79.01: ICD-10-CM

## 2021-03-11 DIAGNOSIS — Z79.899: ICD-10-CM

## 2021-03-11 DIAGNOSIS — R55: Primary | ICD-10-CM

## 2021-03-11 DIAGNOSIS — I25.2: ICD-10-CM

## 2021-03-11 DIAGNOSIS — Z98.890: ICD-10-CM

## 2021-03-11 DIAGNOSIS — I25.10: ICD-10-CM

## 2021-06-30 ENCOUNTER — HOSPITAL ENCOUNTER (OUTPATIENT)
Dept: HOSPITAL 35 - SJCVC | Age: 71
End: 2021-06-30
Attending: INTERNAL MEDICINE
Payer: COMMERCIAL

## 2021-06-30 DIAGNOSIS — Z86.16: ICD-10-CM

## 2021-06-30 DIAGNOSIS — Z90.49: ICD-10-CM

## 2021-06-30 DIAGNOSIS — R94.31: Primary | ICD-10-CM

## 2021-06-30 DIAGNOSIS — Z79.899: ICD-10-CM

## 2021-06-30 DIAGNOSIS — Z79.82: ICD-10-CM

## 2021-06-30 DIAGNOSIS — Z87.891: ICD-10-CM

## 2021-06-30 DIAGNOSIS — I10: ICD-10-CM

## 2021-06-30 DIAGNOSIS — I71.4: ICD-10-CM

## 2021-06-30 DIAGNOSIS — Z95.5: ICD-10-CM

## 2021-06-30 DIAGNOSIS — E78.00: ICD-10-CM

## 2021-06-30 DIAGNOSIS — J44.9: ICD-10-CM

## 2021-06-30 DIAGNOSIS — I25.810: ICD-10-CM

## 2021-06-30 DIAGNOSIS — Z95.1: ICD-10-CM

## 2023-02-13 NOTE — NUR
INITIAL ASSESSMENT:
Received consult for discharge planning. WINNIE reviewed chart and spoke with
hospitalist. Pt was admitted from home. Pt is s/p CABG x 4. Pt is progressing
towards goals for discharge. Discharge home is anticipated in 1-2 days. WINNIE
spoke with pt's wife, Cara, via phone. Introduced role of SW. Pt is
normally alert/orientated x 4. Pt and spouse [place] in [place]. Prior
to admission, pt was independent with ADLs. No use of DME. 4 steps to enter
the house. No steps inside. Pt's wife states that their bathroom has been
modified for pt to have a seat in the shower. No hx of  services or
post-acute placement. Pt's PCP is Dr. Fer Young. Pt's wife states they
would like for pt to go to the outpatient cardiac rehab at [facility].
WINNIE is following to assist as needed with discharge planning. S/p hiatal hernia repair doing well